# Patient Record
Sex: FEMALE | Race: BLACK OR AFRICAN AMERICAN | NOT HISPANIC OR LATINO | Employment: UNEMPLOYED | URBAN - METROPOLITAN AREA
[De-identification: names, ages, dates, MRNs, and addresses within clinical notes are randomized per-mention and may not be internally consistent; named-entity substitution may affect disease eponyms.]

---

## 2019-03-09 ENCOUNTER — HOSPITAL ENCOUNTER (EMERGENCY)
Facility: HOSPITAL | Age: 39
Discharge: HOME | End: 2019-03-09
Attending: EMERGENCY MEDICINE
Payer: COMMERCIAL

## 2019-03-09 VITALS
SYSTOLIC BLOOD PRESSURE: 159 MMHG | HEIGHT: 64 IN | TEMPERATURE: 99 F | WEIGHT: 170 LBS | HEART RATE: 77 BPM | DIASTOLIC BLOOD PRESSURE: 90 MMHG | RESPIRATION RATE: 18 BRPM | BODY MASS INDEX: 29.02 KG/M2 | OXYGEN SATURATION: 100 %

## 2019-03-09 DIAGNOSIS — G44.319 ACUTE POST-TRAUMATIC HEADACHE, NOT INTRACTABLE: ICD-10-CM

## 2019-03-09 DIAGNOSIS — S16.1XXA NECK STRAIN, INITIAL ENCOUNTER: Primary | ICD-10-CM

## 2019-03-09 DIAGNOSIS — S46.912A SHOULDER STRAIN, LEFT, INITIAL ENCOUNTER: ICD-10-CM

## 2019-03-09 DIAGNOSIS — S76.019A HIP STRAIN, INITIAL ENCOUNTER: ICD-10-CM
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
## 2019-03-09 LAB — B-HCG UR QL: NEGATIVE

## 2019-03-09 PROCEDURE — 96372 THER/PROPH/DIAG INJ SC/IM: CPT | Mod: ER

## 2019-03-09 PROCEDURE — 99284 EMERGENCY DEPT VISIT MOD MDM: CPT | Mod: 25,ER

## 2019-03-09 PROCEDURE — 63600175 PHARM REV CODE 636 W HCPCS: Mod: ER | Performed by: PHYSICIAN ASSISTANT

## 2019-03-09 PROCEDURE — 81025 URINE PREGNANCY TEST: CPT | Mod: ER

## 2019-03-09 RX ORDER — CYCLOBENZAPRINE HCL 10 MG
10 TABLET ORAL 3 TIMES DAILY PRN
Qty: 15 TABLET | Refills: 0 | Status: SHIPPED | OUTPATIENT
Start: 2019-03-09 | End: 2019-03-14

## 2019-03-09 RX ORDER — KETOROLAC TROMETHAMINE 30 MG/ML
30 INJECTION, SOLUTION INTRAMUSCULAR; INTRAVENOUS
Status: COMPLETED | OUTPATIENT
Start: 2019-03-09 | End: 2019-03-09

## 2019-03-09 RX ORDER — NAPROXEN 500 MG/1
500 TABLET ORAL 2 TIMES DAILY WITH MEALS
Qty: 12 TABLET | Refills: 0 | Status: SHIPPED | OUTPATIENT
Start: 2019-03-09

## 2019-03-09 RX ADMIN — KETOROLAC TROMETHAMINE 30 MG: 30 INJECTION, SOLUTION INTRAMUSCULAR; INTRAVENOUS at 01:03

## 2019-03-09 NOTE — DISCHARGE INSTRUCTIONS
The CT of your head and neck did not reveal any acute findings.  Your x-rays did not reveal any evidence of fracture at this time.  Your found to have moderate arthritis severe low back.  You are advised to follow up with your primary care provider for re-evaluation within 3 days.  You will be prescribed Flexeril which can make you sleepy.  You cannot drive nor work while taking this medication.   You are  instructed to return to the emergency department immediately for any new or worsening symptoms..

## 2019-03-09 NOTE — ED NOTES
Patient is ambulatory with a steady gait to the restroom with her spouse. Reports body aches to neck lower back and generalized body.
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
## 2020-12-01 ENCOUNTER — TELEPHONE (OUTPATIENT)
Dept: OBSTETRICS AND GYNECOLOGY | Facility: CLINIC | Age: 40
End: 2020-12-01

## 2020-12-02 NOTE — TELEPHONE ENCOUNTER
----- Message from Ivet Saldaña MD sent at 12/1/2020  2:40 PM CST -----  Regarding: robotic myomectomy  Cherie Rajput,  I wanted to refer this patient to you for a robotic myomectomy.  She has one intramural anterior fibroid measuring about 9 cm.  She has had a previous robotic myomectomy by Dr. Wright that was complicated by a return to the OR for bleeding and a blood transfusion.  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  Following the myomectomy the plan is to do IVF for severe male factor.      Thanks!  Ivet

## 2020-12-07 ENCOUNTER — TELEPHONE (OUTPATIENT)
Dept: OBSTETRICS AND GYNECOLOGY | Facility: CLINIC | Age: 40
End: 2020-12-07

## 2020-12-07 NOTE — TELEPHONE ENCOUNTER
Reached out to patient, scheduled appointment as per dr. Hyatt request       Regarding: robotic myomectomy  Cherie Rajput,  I wanted to refer this patient to you for a robotic myomectomy.  She has one intramural anterior fibroid measuring about 9 cm.  She has had a previous robotic myomectomy by Dr. Wright that was complicated by a return to the OR for bleeding and a blood transfusion.  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  Following the myomectomy the plan is to do IVF for severe male factor.       Thanks!  Ivet

## 2021-01-14 ENCOUNTER — TELEPHONE (OUTPATIENT)
Dept: OBSTETRICS AND GYNECOLOGY | Facility: CLINIC | Age: 41
End: 2021-01-14